# Patient Record
Sex: MALE | Race: WHITE
[De-identification: names, ages, dates, MRNs, and addresses within clinical notes are randomized per-mention and may not be internally consistent; named-entity substitution may affect disease eponyms.]

---

## 2021-11-23 ENCOUNTER — APPOINTMENT (OUTPATIENT)
Dept: DERMATOLOGY | Facility: CLINIC | Age: 56
End: 2021-11-23
Payer: COMMERCIAL

## 2021-11-23 PROCEDURE — 99203 OFFICE O/P NEW LOW 30 MIN: CPT

## 2022-08-01 PROBLEM — Z00.00 ENCOUNTER FOR PREVENTIVE HEALTH EXAMINATION: Status: ACTIVE | Noted: 2022-08-01

## 2022-08-03 ENCOUNTER — RESULT REVIEW (OUTPATIENT)
Age: 57
End: 2022-08-03

## 2022-09-14 ENCOUNTER — NON-APPOINTMENT (OUTPATIENT)
Age: 57
End: 2022-09-14

## 2022-09-14 ENCOUNTER — APPOINTMENT (OUTPATIENT)
Dept: INFECTIOUS DISEASE | Facility: CLINIC | Age: 57
End: 2022-09-14

## 2022-09-14 VITALS
BODY MASS INDEX: 27.92 KG/M2 | TEMPERATURE: 98 F | HEART RATE: 99 BPM | SYSTOLIC BLOOD PRESSURE: 112 MMHG | HEIGHT: 70 IN | RESPIRATION RATE: 15 BRPM | WEIGHT: 195 LBS | OXYGEN SATURATION: 98 % | DIASTOLIC BLOOD PRESSURE: 80 MMHG

## 2022-09-14 DIAGNOSIS — I10 ESSENTIAL (PRIMARY) HYPERTENSION: ICD-10-CM

## 2022-09-14 DIAGNOSIS — E11.9 TYPE 2 DIABETES MELLITUS W/OUT COMPLICATIONS: ICD-10-CM

## 2022-09-14 DIAGNOSIS — D64.9 ANEMIA, UNSPECIFIED: ICD-10-CM

## 2022-09-14 DIAGNOSIS — R50.9 FEVER, UNSPECIFIED: ICD-10-CM

## 2022-09-14 DIAGNOSIS — E78.5 HYPERLIPIDEMIA, UNSPECIFIED: ICD-10-CM

## 2022-09-14 DIAGNOSIS — K21.9 GASTRO-ESOPHAGEAL REFLUX DISEASE W/OUT ESOPHAGITIS: ICD-10-CM

## 2022-09-14 PROCEDURE — 99215 OFFICE O/P EST HI 40 MIN: CPT

## 2022-09-14 RX ORDER — AMLODIPINE BESYLATE 10 MG/1
10 TABLET ORAL
Refills: 0 | Status: ACTIVE | COMMUNITY

## 2022-09-14 RX ORDER — NEBIVOLOL 20 MG/1
TABLET ORAL
Refills: 0 | Status: ACTIVE | COMMUNITY

## 2022-09-14 RX ORDER — PANTOPRAZOLE 20 MG/1
20 TABLET, DELAYED RELEASE ORAL
Refills: 0 | Status: ACTIVE | COMMUNITY

## 2022-09-14 RX ORDER — ATORVASTATIN CALCIUM 10 MG/1
10 TABLET, FILM COATED ORAL
Refills: 0 | Status: ACTIVE | COMMUNITY

## 2022-09-14 NOTE — REVIEW OF SYSTEMS
[Fever] : fever [Chills] : chills [Body Aches] : body aches [Feeling Tired] : feeling tired [Recent Weight Loss (___ Lbs)] : recent [unfilled] ~Ulb weight loss [Eye Pain] : no eye pain [Eyesight Problems] : no eyesight problems [Nasal Discharge] : no nasal discharge [Sore Throat] : no sore throat [Chest Pain] : no chest pain [Palpitations] : no palpitations [Leg Claudication] : no intermittent leg claudication [Cough] : no cough [Sputum] : not coughing up ~M sputum [Pleuritic Chest Pain] : no pleuritic chest pain [Abdominal Pain] : no abdominal pain [Constipation] : no constipation [Diarrhea] : no diarrhea [Joint Pain] : no joint pain [Joint Swelling] : no joint swelling [Skin Lesions] : no skin lesions [Skin Wound] : no skin wound [Anxiety] : no anxiety [Depression] : no depression [Easy Bleeding] : no tendency for easy bleeding [Easy Bruising] : no tendency for easy bruising [Swollen Glands] : no swollen glands [FreeTextEntry2] : Patient notes pain in his shoulders torso and legs after doing routine yard work

## 2022-09-14 NOTE — ASSESSMENT
[FreeTextEntry1] : 57-year-old man with fever of unknown origin etiology appears to be possibly autoimmune and given the muscle and joint pain and elevated ESR could be concerning for polymyalgia rheumatica versus another rheumatologic process.  Other possibilities include an indolent or low-grade lymphoma which has not been completely ruled out as he had a nondiagnostic axillary lymph node biopsy.  Infectious etiologies seem less likely given the clinical presentation low clinical suspicion for endocarditis no obvious risk factors for syphilis or HIV infection.  Less likely could be rare chronic infection such as Bartonella Brucella or CMV\par \par Recommendations\par 1.  Await results of rheumatologic work-up\par 2.  Serologies were drawn for Bartonella Brucella Q fever EBV CMV and syphilis\par 3.  If above work-up is nondiagnostic suspect he may ultimately need a bone marrow biopsy or and repeat PET scan\par \par Case was discussed with the patient at length and all questions were answered I also spoke with the referring physician Dr. Pruitt via phone regarding the above findings.

## 2022-09-14 NOTE — PHYSICAL EXAM
[General Appearance - Alert] : alert [General Appearance - In No Acute Distress] : in no acute distress [General Appearance - Well Nourished] : well nourished [General Appearance - Well-Appearing] : healthy appearing [Sclera] : the sclera and conjunctiva were normal [Extraocular Movements] : extraocular movements were intact [Outer Ear] : the ears and nose were normal in appearance [Hearing Threshold Finger Rub Not [place]] : hearing was normal [Examination Of The Oral Cavity] : the lips and gums were normal [Oropharynx] : the oropharynx was normal with no thrush [Neck Appearance] : the appearance of the neck was normal [Neck Cervical Mass (___cm)] : no neck mass was observed [Jugular Venous Distention Increased] : there was no jugular-venous distention [Respiration, Rhythm And Depth] : normal respiratory rhythm and effort [Auscultation Breath Sounds / Voice Sounds] : lungs were clear to auscultation bilaterally [Heart Rate And Rhythm] : heart rate was normal and rhythm regular [Heart Sounds] : normal S1 and S2 [Murmurs] : no murmurs [Abdomen Soft] : soft [Abdomen Tenderness] : non-tender [No Palpable Adenopathy] : no palpable adenopathy [Cervical Lymph Nodes Enlarged Anterior Bilaterally] : anterior cervical [Axillary Lymph Nodes Enlarged Bilaterally] : axillary [Musculoskeletal - Swelling] : no joint swelling [Nail Clubbing] : no clubbing  or cyanosis of the fingernails [Motor Tone] : muscle strength and tone were normal [Skin Color & Pigmentation] : normal skin color and pigmentation [] : no rash [Deep Tendon Reflexes (DTR)] : deep tendon reflexes were 2+ and symmetric [Sensation] : the sensory exam was normal to light touch and pinprick [Oriented To Time, Place, And Person] : oriented to person, place, and time [Affect] : the affect was normal

## 2022-09-14 NOTE — REASON FOR VISIT
[Consultation] : a consultation visit [FreeTextEntry1] : New pt reffered by PCP, Jeanine, for FUO\par c/o body aches, joint pains, and fevers ()\par Doxycycline x 10 days ~ 1 month; not currently on antibiotics\par pt states he saw Rheumatoloist 9/12/22, was given Prednisone which he has not yet started

## 2022-09-14 NOTE — HISTORY OF PRESENT ILLNESS
[FreeTextEntry1] : 57-year-old man referred to the office today by Dr. Pruitt for evaluation of fever of unknown origin.  Patient states his symptoms have been present since March of this year.  He states the fevers occur mostly at night with some associated chills to a height of 102 degrees.  He has had increasing weight loss over 10 pounds and fatigue since the symptoms began.  He is undergone a work-up which is included a CT scan of the chest abdomen and pelvis most studies did reveal the presence of some axillary adenopathy on a study done June 30.  He underwent a lymph node biopsy on August 3 which showed no obvious evidence of lymphoma.  He has had extensive serology done which was reviewed from hematology office which was negative for any obvious tickborne infection the only abnormal serological markers of note were an elevated ESR to 99.  Work-up for myeloproliferative disorder was unremarkable.  He has not had a bone marrow biopsy.\par More recently he has seen a rheumatologist as well ordered multiples serologic testing those results are currently pending.\par No history of recent dental work or surgical procedure\par His past medical history otherwise noted for diabetes and hypertension.  \par Social history patient lives at home with his wife with whom he is monogamous he works as a  he lives in [place].  He has 2 dogs.\par He is a non-smoker and has limited alcohol intake.  No significant travel history no significant history of drug use.\par Family history is notable for a mother with some form of gynecologic malignancy.  No other history of heart disease immune disease or infectious disease.

## 2022-09-21 LAB
B HENSELAE IGG SER-ACNC: NEGATIVE TITER
B HENSELAE IGM SER QL: NEGATIVE TITER
B QUINTANA IGG SER QL: NEGATIVE TITER
B QUINTANA IGM SER QL: NEGATIVE TITER
BRUCELLA AB IGG, EIA: NEGATIVE
BRUCELLA AB IGM, EIA: NEGATIVE
CMV DNA SPEC QL NAA+PROBE: NOT DETECTED IU/ML
CMVPCR LOG: NOT DETECTED LOG10IU/ML
EBV DNA SERPL NAA+PROBE-ACNC: NOT DETECTED IU/ML
EBVPCR LOG: NOT DETECTED LOG10IU/ML
RPR SER-TITR: NORMAL

## 2023-02-20 ENCOUNTER — OFFICE (OUTPATIENT)
Dept: URBAN - METROPOLITAN AREA CLINIC 113 | Facility: CLINIC | Age: 58
Setting detail: OPHTHALMOLOGY
End: 2023-02-20
Payer: COMMERCIAL

## 2023-02-20 DIAGNOSIS — H02.824: ICD-10-CM

## 2023-02-20 DIAGNOSIS — H01.001: ICD-10-CM

## 2023-02-20 DIAGNOSIS — H25.13: ICD-10-CM

## 2023-02-20 DIAGNOSIS — H52.7: ICD-10-CM

## 2023-02-20 DIAGNOSIS — H40.033: ICD-10-CM

## 2023-02-20 DIAGNOSIS — H01.004: ICD-10-CM

## 2023-02-20 PROCEDURE — 92250 FUNDUS PHOTOGRAPHY W/I&R: CPT | Performed by: STUDENT IN AN ORGANIZED HEALTH CARE EDUCATION/TRAINING PROGRAM

## 2023-02-20 PROCEDURE — 92015 DETERMINE REFRACTIVE STATE: CPT | Performed by: STUDENT IN AN ORGANIZED HEALTH CARE EDUCATION/TRAINING PROGRAM

## 2023-02-20 PROCEDURE — 92020 GONIOSCOPY: CPT | Performed by: STUDENT IN AN ORGANIZED HEALTH CARE EDUCATION/TRAINING PROGRAM

## 2023-02-20 PROCEDURE — 92014 COMPRE OPH EXAM EST PT 1/>: CPT | Performed by: STUDENT IN AN ORGANIZED HEALTH CARE EDUCATION/TRAINING PROGRAM

## 2023-02-20 PROCEDURE — 76514 ECHO EXAM OF EYE THICKNESS: CPT | Performed by: STUDENT IN AN ORGANIZED HEALTH CARE EDUCATION/TRAINING PROGRAM

## 2023-02-20 ASSESSMENT — REFRACTION_MANIFEST
OD_VA1: 20/20
OS_AXIS: 069
OD_VA1: 20/20
OS_CYLINDER: -0.75
OS_SPHERE: +2.00
OS_AXIS: 066
OD_CYLINDER: -0.25
OD_AXIS: 107
OD_ADD: +2.50
OS_AXIS: 060
OD_VA1: 20/20
OD_SPHERE: +2.25
OS_CYLINDER: -1.25
OS_ADD: +1.75
OD_ADD: +1.75
OD_ADD: +1.75
OS_CYLINDER: -1.25
OS_VA1: 20/20
OS_VA2: 20/20
OS_ADD: +2.50
OD_SPHERE: +1.25
OS_VA1: 20/20
OS_ADD: +1.75
OD_VA2: 20/20
OD_CYLINDER: SPH
OS_SPHERE: +2.50
OS_SPHERE: +2.25
OS_VA1: 20/20
OU_VA: 20/20
OD_SPHERE: +2.00

## 2023-02-20 ASSESSMENT — VISUAL ACUITY
OD_BCVA: 20/40-1
OS_BCVA: 20/50-1

## 2023-02-20 ASSESSMENT — PACHYMETRY
OS_CT_CORRECTION: -5
OS_CT_UM: 615
OD_CT_CORRECTION: -5
OD_CT_UM: 615

## 2023-02-20 ASSESSMENT — KERATOMETRY
OD_K2POWER_DIOPTERS: 42.75
OD_AXISANGLE_DEGREES: 174
OD_K1POWER_DIOPTERS: 42.25
OS_K1POWER_DIOPTERS: 42.50
OS_AXISANGLE_DEGREES: 166
OS_K2POWER_DIOPTERS: 43.25

## 2023-02-20 ASSESSMENT — REFRACTION_CURRENTRX
OD_AXIS: 26
OD_VPRISM_DIRECTION: SV
OS_OVR_VA: 20/
OD_CYLINDER: -0.25
OD_OVR_VA: 20/
OS_SPHERE: +2.25
OD_SPHERE: +2.00
OS_CYLINDER: -0.25
OS_VPRISM_DIRECTION: SV
OS_AXIS: 85

## 2023-02-20 ASSESSMENT — AXIALLENGTH_DERIVED
OS_AL: 23.1952
OS_AL: 23.1014
OS_AL: 23.1952
OS_AL: 23.1014
OD_AL: 23.1401
OD_AL: 23.2342

## 2023-02-20 ASSESSMENT — TONOMETRY
OS_IOP_MMHG: 16
OD_IOP_MMHG: 16

## 2023-02-20 ASSESSMENT — REFRACTION_AUTOREFRACTION
OS_CYLINDER: -1.25
OD_AXIS: 100
OD_SPHERE: +2.00
OS_AXIS: 069
OD_CYLINDER: -0.25
OS_SPHERE: +2.50

## 2023-02-20 ASSESSMENT — LID EXAM ASSESSMENTS
OD_BLEPHARITIS: 1+
OS_BLEPHARITIS: 1+

## 2023-02-20 ASSESSMENT — CONFRONTATIONAL VISUAL FIELD TEST (CVF)
OD_FINDINGS: FULL
OS_FINDINGS: FULL

## 2023-02-20 ASSESSMENT — SPHEQUIV_DERIVED
OS_SPHEQUIV: 1.625
OS_SPHEQUIV: 1.875
OD_SPHEQUIV: 2.125
OS_SPHEQUIV: 1.875
OS_SPHEQUIV: 1.625
OD_SPHEQUIV: 1.875

## 2024-02-20 ENCOUNTER — APPOINTMENT (OUTPATIENT)
Dept: OPHTHALMOLOGY | Facility: CLINIC | Age: 59
End: 2024-02-20

## 2024-07-29 ENCOUNTER — RESULT REVIEW (OUTPATIENT)
Age: 59
End: 2024-07-29

## 2024-07-29 ENCOUNTER — APPOINTMENT (OUTPATIENT)
Dept: DERMATOLOGY | Facility: CLINIC | Age: 59
End: 2024-07-29
Payer: COMMERCIAL

## 2024-07-29 PROCEDURE — 99213 OFFICE O/P EST LOW 20 MIN: CPT | Mod: 25

## 2024-07-29 PROCEDURE — 17271 DSTR MAL LES S/N/H/F/G 0.6-1: CPT

## 2025-08-04 ENCOUNTER — APPOINTMENT (OUTPATIENT)
Dept: DERMATOLOGY | Facility: CLINIC | Age: 60
End: 2025-08-04
Payer: COMMERCIAL

## 2025-08-04 PROCEDURE — 99213 OFFICE O/P EST LOW 20 MIN: CPT
